# Patient Record
Sex: FEMALE | Race: WHITE | NOT HISPANIC OR LATINO | Employment: FULL TIME | URBAN - METROPOLITAN AREA
[De-identification: names, ages, dates, MRNs, and addresses within clinical notes are randomized per-mention and may not be internally consistent; named-entity substitution may affect disease eponyms.]

---

## 2024-10-24 ENCOUNTER — OFFICE VISIT (OUTPATIENT)
Dept: FAMILY MEDICINE CLINIC | Facility: CLINIC | Age: 29
End: 2024-10-24
Payer: COMMERCIAL

## 2024-10-24 VITALS
BODY MASS INDEX: 47.31 KG/M2 | TEMPERATURE: 98.6 F | OXYGEN SATURATION: 98 % | HEIGHT: 61 IN | WEIGHT: 250.6 LBS | HEART RATE: 96 BPM

## 2024-10-24 DIAGNOSIS — J00 COMMON COLD: Primary | ICD-10-CM

## 2024-10-24 PROCEDURE — 99213 OFFICE O/P EST LOW 20 MIN: CPT | Performed by: NURSE PRACTITIONER

## 2024-10-24 RX ORDER — GUAIFENESIN 600 MG/1
1200 TABLET, EXTENDED RELEASE ORAL 2 TIMES DAILY
Qty: 40 TABLET | Refills: 0 | Status: SHIPPED | OUTPATIENT
Start: 2024-10-24

## 2024-10-24 RX ORDER — DEXTROMETHORPHAN POLISTIREX 30 MG/5ML
30 SUSPENSION ORAL
Qty: 280 ML | Refills: 1 | Status: SHIPPED | OUTPATIENT
Start: 2024-10-24

## 2024-10-24 NOTE — PROGRESS NOTES
Subjective   Agatha RIVERO is a 29 y.o. female.     Chief Complaint   Patient presents with    Cough     Wheezing X' 3 days  Not taking anything   HPI Camacho Rivero is a 29-year-old female presenting with cough and bad cold, she is 16 weeks pregnant    History of Present Illness  The patient is a 29-year-old female who presents for evaluation of cough and congestion    She is currently 16 weeks pregnant and has been experiencing a severe cough for the past 3 days. She reports no fever but has been producing a significant amount of phlegm. Additionally, she has been experiencing urinary incontinence during coughing episodes.       The following portions of the patient's history were reviewed and updated as appropriate: allergies, current medications, past family history, past medical history, past social history, past surgical history and problem list.    Results         Objective     Vitals:    10/24/24 0933   Pulse: 96   Temp: 98.6 °F (37 °C)   SpO2: 98%      Body mass index is 47.38 kg/m².    Physical Exam  Constitutional:       Appearance: Normal appearance.   HENT:      Head: Normocephalic and atraumatic.      Nose: Nose normal.      Mouth/Throat:      Mouth: Mucous membranes are moist.   Eyes:      Pupils: Pupils are equal, round, and reactive to light.   Cardiovascular:      Rate and Rhythm: Normal rate and regular rhythm.      Pulses: Normal pulses.      Heart sounds: Normal heart sounds.   Pulmonary:      Effort: Pulmonary effort is normal.      Breath sounds: Normal breath sounds.   Abdominal:      General: Bowel sounds are normal.   Musculoskeletal:         General: Normal range of motion.      Cervical back: Normal range of motion.   Skin:     General: Skin is warm and dry.      Capillary Refill: Capillary refill takes less than 2 seconds.   Neurological:      General: No focal deficit present.      Mental Status: She is alert.   Psychiatric:         Mood and Affect: Mood normal.         Assessment &  Plan  1. Cough.  She reports a severe cough that started about 3 days ago, accompanied by significant mucus production but no fever. A cool mist humidifier was recommended for use in her bedroom at night to help keep her nasal passages moist, which may alleviate the cough. Guaifenesin 1200 mg twice daily for 10 days was prescribed to break up the congestion. She was advised to drink plenty of water to enhance the effectiveness of the mucolytic. Plain Mucinex was also prescribed to aid in mucus clearance. For nighttime cough relief, Delsym was prescribed to help her rest. If symptoms persist after 10 days, an antibiotic will be considered.    Diagnoses and all orders for this visit:    1. Common cold (Primary)  -     guaiFENesin (Mucinex) 600 MG 12 hr tablet; Take 2 tablets by mouth 2 (Two) Times a Day.  Dispense: 40 tablet; Refill: 0  -     dextromethorphan polistirex ER (Delsym) 30 MG/5ML Suspension Extended Release oral suspension; Take 5 mL by mouth every night at bedtime.  Dispense: 280 mL; Refill: 1      MDM:     Pregnancy in 16th week.  S/S cough and congestion x 3 days, too early for antibiotic  Guaifenesin to break up congestion safe in pregnancy  Dextromethorphan for bedtime use to help patient's sleep also safe in pregnancy  5.  If patient is no better by the 10th day or has worsening symptoms antibiotics will be considered.  Body mass index is 47.38 kg/m².       Discussed Care Gaps, ordered referrals and encouraged vaccination updates.       - Pt agrees with plan of care and denies further questions/concerns today  - This document is intended for medical expert use only. Persons  reading this document without medical staff guidance may result in misinterpretation and unintended morbidity     Go to the ER for any possible life-threatening symptoms such as chest pain or shortness of air.      Please allow 3-5 business days for recommendations based on new results      I personally spent time with this  patient, preparing for the visit, reviewing tests, obtaining and/or reviewing a separately obtained history, performing a medically appropriate examination and/or evaluation, counseling and educating the patient/family/caregiver, ordering medications,  documenting information in the medical record and indepentently interpreting results.       Patient or patient representative verbalized consent for the use of Ambient Listening during the visit with  JOANA Alvarez for chart documentation. 10/24/2024  10:00 EDT    No follow-ups on file.

## 2025-01-31 ENCOUNTER — PATIENT MESSAGE (OUTPATIENT)
Dept: FAMILY MEDICINE CLINIC | Facility: CLINIC | Age: 30
End: 2025-01-31
Payer: COMMERCIAL

## 2025-01-31 RX ORDER — OSELTAMIVIR PHOSPHATE 75 MG/1
75 CAPSULE ORAL 2 TIMES DAILY
Qty: 10 CAPSULE | Refills: 0 | Status: SHIPPED | OUTPATIENT
Start: 2025-01-31 | End: 2025-02-05

## 2025-08-25 ENCOUNTER — OFFICE VISIT (OUTPATIENT)
Dept: FAMILY MEDICINE CLINIC | Facility: CLINIC | Age: 30
End: 2025-08-25
Payer: MEDICAID

## 2025-08-25 VITALS
BODY MASS INDEX: 43.08 KG/M2 | TEMPERATURE: 98.4 F | WEIGHT: 228.2 LBS | HEIGHT: 61 IN | OXYGEN SATURATION: 98 % | SYSTOLIC BLOOD PRESSURE: 120 MMHG | HEART RATE: 69 BPM | DIASTOLIC BLOOD PRESSURE: 68 MMHG

## 2025-08-25 DIAGNOSIS — L03.116 CELLULITIS OF LEFT THIGH: Primary | ICD-10-CM

## 2025-08-25 DIAGNOSIS — U07.1 COVID-19: ICD-10-CM

## 2025-08-25 DIAGNOSIS — R09.81 SINUS CONGESTION: ICD-10-CM

## 2025-08-25 PROCEDURE — 1160F RVW MEDS BY RX/DR IN RCRD: CPT

## 2025-08-25 PROCEDURE — 99213 OFFICE O/P EST LOW 20 MIN: CPT

## 2025-08-25 PROCEDURE — 1159F MED LIST DOCD IN RCRD: CPT

## 2025-08-25 RX ORDER — CEPHALEXIN 500 MG/1
500 CAPSULE ORAL EVERY 6 HOURS
Qty: 20 CAPSULE | Refills: 0 | Status: SHIPPED | OUTPATIENT
Start: 2025-08-25 | End: 2025-08-30